# Patient Record
(demographics unavailable — no encounter records)

---

## 2019-10-26 NOTE — EKG
[address] [facility]
[place]  [address]
Phone:  [phone]                    ELECTROCARDIOGRAM REPORT      
_______________________________________________________________________________
 
Name:       JOSE CRUZ NASH     [facility] #:                     [facility]      [id number]     Account #:      [id number]  
Admission:  10/25/19    Attend Phys:                          
Discharge:  10/26/19    Date of Birth:  57  
                                                          Report #: [id number]
   [id number]
_______________________________________________________________________________
THIS REPORT FOR:   //name//                          
 
                         [facility] ED
                                       
Test Date:    2019-10-25               Test Time:    20:46:34
Pat Name:     JOSE CRUZ AMARALepartment:   
Patient ID:   [id number]            Room:          
Gender:       M                        Technician:   SAUL
:          1957               Requested By: Kindra Willett
Order Number: [id number] MD:   Broderick Yeung
                                 Measurements
Intervals                              Axis          
Rate:         88                       P:            8
IN:           169                      QRS:          254
QRSD:         106                      T:            5
QT:           403                                    
QTc:          488                                    
                           Interpretive Statements
Sinus rhythm
Poor R wave progression
Leftward axis
Nonspecific intraventricular conduction delay
No previous ECG available for comparison
 
Electronically Signed On 10- 11:07:59 CDT by Broderick Yeung
[email or web address]
 
 
 
 
 
 
 
 
 
 
 
 
 
 
 
 
 
  <ELECTRONICALLY SIGNED>
   By: Broderick Yeung MD, [facility]   
  10/26/19     1107
D: 10/25/19 2046                           _____________________________________
T: 10/25/19 2046                           Broderick Yeung MD, FACC     /EPI